# Patient Record
Sex: FEMALE | Employment: FULL TIME | URBAN - METROPOLITAN AREA
[De-identification: names, ages, dates, MRNs, and addresses within clinical notes are randomized per-mention and may not be internally consistent; named-entity substitution may affect disease eponyms.]

---

## 2021-08-30 ENCOUNTER — HOSPITAL ENCOUNTER (EMERGENCY)
Facility: CLINIC | Age: 26
Discharge: HOME | End: 2021-08-30

## 2021-08-30 VITALS — RESPIRATION RATE: 17 BRPM | OXYGEN SATURATION: 100 % | HEART RATE: 81 BPM | TEMPERATURE: 98 F | WEIGHT: 293 LBS

## 2021-08-30 RX ORDER — PROMETHAZINE HYDROCHLORIDE 25 MG/1
25 TABLET ORAL EVERY 8 HOURS PRN
COMMUNITY

## 2021-08-30 RX ORDER — PREDNISOLONE 5 MG/1
40 TABLET ORAL
COMMUNITY

## 2021-10-17 ENCOUNTER — HEALTH MAINTENANCE LETTER (OUTPATIENT)
Age: 26
End: 2021-10-17

## 2022-10-03 ENCOUNTER — HEALTH MAINTENANCE LETTER (OUTPATIENT)
Age: 27
End: 2022-10-03

## 2022-10-18 ENCOUNTER — TRANSCRIBE ORDERS (OUTPATIENT)
Dept: OTHER | Age: 27
End: 2022-10-18

## 2022-10-18 DIAGNOSIS — E66.01 MORBID OBESITY WITH BMI OF 50.0-59.9, ADULT (H): Primary | ICD-10-CM

## 2022-10-26 NOTE — TELEPHONE ENCOUNTER
REFERRAL INFORMATION:    Referring Provider:     Referring Clinic:      Reason for Visit/Diagnosis: Crohn's Disease, Morbid obesity with BMI 50.59     FUTURE VISIT INFORMATION:    Appointment Date: 04-20-23    Appointment Time: 11:20 PM      NOTES STATUS DETAILS   OFFICE NOTE from Referring Provider N/A    OFFICE NOTE from Other Specialist Care Everywhere 5/17/2022 Office visit with Katie Hobson PA-C ([facility])     1/25/2022 Office visit with CHRISTINA Santos CNP ([facility])     9/23/2021 Office visit with HCRISTINA Juares CNP ([facility])     2/8/19, 10/19/18 Office visit with Dr. Stu Astorga ([facility])     9/20/18 Office visit with Maddie Ferreira NP ([facility])    8/1/18 Office visit with Dr. Edu Hale ([facility])      HOSPITAL DISCHARGE SUMMARY/  ED VISITS Care Everywhere 9/5/2022 ([facility])   8/10/2020, 8/9/2020, 6/17/19, 8/1/18 ([facility])    ** More visits in CE   OPERATIVE REPORT N/A    MEDICATION LIST Internal         ENDOSCOPY  Care Everywhere EGD: 5/18/2022 ([facility])   EGD: 5/14/2022 ([facility])      COLONOSCOPY Care Everywhere 5/18/2022 ([facility])   4/22/2021 ([facility])    ERCP N/A    EUS N/A    STOOL TESTING Care Everywhere 9/5/2022, 1/9/2022   PERTINENT LABS Care Everywhere    PATHOLOGY REPORTS (RELATED) Care Everywhere 5/18/2022   IMAGING (CT, MRI, EGD, MRCP, Small Bowel Follow Through/SBT, MR/CT Enterography) Care Everywhere [facility]:  - CT Abdomen Pelvis: 6/22/2022    [facility]:  - CT Abdomen Pelvis: 1/8/2022 12/6/2022 3:11pm Fax request sent to [facility] ([phone]) for Op reports. Fax request sent to [facility] for images noted above. -Gerard     12/23/2022 8:19am Fax request sent to [facility] for med recs. -Gerard

## 2023-01-02 ENCOUNTER — MYC MEDICAL ADVICE (OUTPATIENT)
Dept: GASTROENTEROLOGY | Facility: CLINIC | Age: 28
End: 2023-01-02

## 2023-01-05 NOTE — TELEPHONE ENCOUNTER
Called to remind patient of their upcoming appointment with our GI clinic, on Tuesday 1/17/2023 at 4:00PM with Dr. Croft. This appointment is scheduled as an in-person appt. Please arrive 15 minutes early to check in for your appointment. , if your appointment is virtual (video or telephone) you need to be in [place] for the visit. To reschedule or cancel patient to call [phone].    Pam Canela MA

## 2023-01-17 ENCOUNTER — PRE VISIT (OUTPATIENT)
Dept: GASTROENTEROLOGY | Facility: CLINIC | Age: 28
End: 2023-01-17

## 2023-02-11 ENCOUNTER — HEALTH MAINTENANCE LETTER (OUTPATIENT)
Age: 28
End: 2023-02-11

## 2024-03-09 ENCOUNTER — HEALTH MAINTENANCE LETTER (OUTPATIENT)
Age: 29
End: 2024-03-09